# Patient Record
Sex: MALE | Race: OTHER | Employment: UNEMPLOYED | URBAN - METROPOLITAN AREA
[De-identification: names, ages, dates, MRNs, and addresses within clinical notes are randomized per-mention and may not be internally consistent; named-entity substitution may affect disease eponyms.]

---

## 2024-06-24 ENCOUNTER — HOSPITAL ENCOUNTER (EMERGENCY)
Facility: HOSPITAL | Age: 24
Discharge: HOME | End: 2024-06-24
Attending: EMERGENCY MEDICINE

## 2024-06-24 VITALS
SYSTOLIC BLOOD PRESSURE: 139 MMHG | RESPIRATION RATE: 18 BRPM | TEMPERATURE: 98.2 F | WEIGHT: 160.5 LBS | OXYGEN SATURATION: 97 % | HEART RATE: 74 BPM | DIASTOLIC BLOOD PRESSURE: 68 MMHG

## 2024-06-24 DIAGNOSIS — K08.89 TOOTHACHE: Primary | ICD-10-CM

## 2024-06-24 PROCEDURE — 96372 THER/PROPH/DIAG INJ SC/IM: CPT

## 2024-06-24 PROCEDURE — 99282 EMERGENCY DEPT VISIT SF MDM: CPT

## 2024-06-24 PROCEDURE — 99284 EMERGENCY DEPT VISIT MOD MDM: CPT | Performed by: PHYSICIAN ASSISTANT

## 2024-06-24 RX ORDER — IBUPROFEN 600 MG/1
600 TABLET ORAL EVERY 6 HOURS PRN
Qty: 30 TABLET | Refills: 0 | Status: SHIPPED | OUTPATIENT
Start: 2024-06-24

## 2024-06-24 RX ORDER — LIDOCAINE HYDROCHLORIDE 20 MG/ML
15 SOLUTION OROPHARYNGEAL ONCE
Status: COMPLETED | OUTPATIENT
Start: 2024-06-24 | End: 2024-06-24

## 2024-06-24 RX ORDER — KETOROLAC TROMETHAMINE 30 MG/ML
15 INJECTION, SOLUTION INTRAMUSCULAR; INTRAVENOUS ONCE
Status: COMPLETED | OUTPATIENT
Start: 2024-06-24 | End: 2024-06-24

## 2024-06-24 RX ORDER — LIDOCAINE HYDROCHLORIDE 20 MG/ML
10 SOLUTION OROPHARYNGEAL 4 TIMES DAILY PRN
Qty: 100 ML | Refills: 0 | Status: SHIPPED | OUTPATIENT
Start: 2024-06-24

## 2024-06-24 RX ORDER — PENICILLIN V POTASSIUM 500 MG/1
500 TABLET ORAL 4 TIMES DAILY
Qty: 28 TABLET | Refills: 0 | Status: SHIPPED | OUTPATIENT
Start: 2024-06-24 | End: 2024-07-01

## 2024-06-24 RX ORDER — PENICILLIN V POTASSIUM 250 MG/1
500 TABLET ORAL ONCE
Status: COMPLETED | OUTPATIENT
Start: 2024-06-24 | End: 2024-06-24

## 2024-06-24 RX ADMIN — PENICILLIN V POTASSIUM 500 MG: 250 TABLET, FILM COATED ORAL at 23:29

## 2024-06-24 RX ADMIN — LIDOCAINE HYDROCHLORIDE 15 ML: 20 SOLUTION ORAL at 23:29

## 2024-06-24 RX ADMIN — KETOROLAC TROMETHAMINE 15 MG: 30 INJECTION, SOLUTION INTRAMUSCULAR at 23:30

## 2024-06-24 NOTE — Clinical Note
Andreas Foreman was seen and treated in our emergency department on 6/24/2024.    No restrictions            Diagnosis: Toothache    Andreas  may return to work on return date.    He may return on this date: 06/26/2024         If you have any questions or concerns, please don't hesitate to call.      Carri Garcia PA-C    ______________________________           _______________          _______________  Hospital Representative                              Date                                Time

## 2024-06-25 NOTE — ED PROVIDER NOTES
History  Chief Complaint   Patient presents with    Dental Pain     Pt reports dental pain bilaterally x3 days. Reports unable to eat due to pain.      This is a 24-year-old male presenting to the ED for evaluation of bilateral lower dental pain for the past 3 days.  Patient is complaining of pain with eating due to pain.  Patient states that he has a history of cavities in bilateral lower teeth which he had filled.  Patient feels like the filling has fallen out.  Patient does not have an established dentist in the area.  Patient took Motrin and Tylenol without improvement of symptoms.  He denies any fevers or chills.  He is tolerating secretions.      History provided by:  Patient   used: No        None       History reviewed. No pertinent past medical history.    History reviewed. No pertinent surgical history.    History reviewed. No pertinent family history.  I have reviewed and agree with the history as documented.    E-Cigarette/Vaping     E-Cigarette/Vaping Substances     Social History     Tobacco Use    Smoking status: Never    Smokeless tobacco: Never   Substance Use Topics    Alcohol use: Yes     Comment: occ    Drug use: Never       Review of Systems   Constitutional:  Negative for chills and fever.   HENT:  Positive for dental problem. Negative for trouble swallowing.    All other systems reviewed and are negative.      Physical Exam  Physical Exam  Vitals reviewed.   Constitutional:       General: He is not in acute distress.     Appearance: Normal appearance. He is well-developed and well-groomed. He is not ill-appearing.   HENT:      Head: Normocephalic and atraumatic.      Jaw: No trismus or tenderness.      Right Ear: External ear normal.      Left Ear: External ear normal.      Nose: Nose normal.      Mouth/Throat:      Lips: Pink.      Mouth: Mucous membranes are moist.      Dentition: Abnormal dentition. Dental tenderness and dental caries present. No gingival swelling or  dental abscesses.      Comments: Poor overall dental condition.  Eroded teeth and dental caries.  No obvious dental abscess on exam.  No tenderness to palpation of the jaw.  Tolerating secretions without difficulty.  Eyes:      General: No scleral icterus.     Conjunctiva/sclera: Conjunctivae normal.   Cardiovascular:      Rate and Rhythm: Normal rate and regular rhythm.   Pulmonary:      Effort: Pulmonary effort is normal.      Breath sounds: No stridor.   Abdominal:      General: There is no distension.   Musculoskeletal:         General: No deformity. Normal range of motion.      Cervical back: Normal range of motion.   Skin:     Coloration: Skin is not jaundiced or pale.      Findings: No lesion or rash.   Neurological:      Mental Status: He is alert and oriented to person, place, and time.   Psychiatric:         Mood and Affect: Mood normal.         Behavior: Behavior normal. Behavior is cooperative.         Vital Signs  ED Triage Vitals   Temperature Pulse Respirations Blood Pressure SpO2   06/24/24 2305 06/24/24 2305 06/24/24 2305 06/24/24 2305 06/24/24 2305   98.2 °F (36.8 °C) 74 18 139/68 97 %      Temp Source Heart Rate Source Patient Position - Orthostatic VS BP Location FiO2 (%)   06/24/24 2305 06/24/24 2305 06/24/24 2305 06/24/24 2305 --   Oral Monitor Sitting Right arm       Pain Score       06/24/24 2330       8           Vitals:    06/24/24 2305   BP: 139/68   Pulse: 74   Patient Position - Orthostatic VS: Sitting         Visual Acuity      ED Medications  Medications   Lidocaine Viscous HCl (XYLOCAINE) 2 % mucosal solution 15 mL (15 mL Swish & Spit Given 6/24/24 2329)   ketorolac (TORADOL) injection 15 mg (15 mg Intramuscular Given 6/24/24 2330)   penicillin V potassium (VEETID) tablet 500 mg (500 mg Oral Given 6/24/24 2329)       Diagnostic Studies  Results Reviewed       None                   No orders to display              Procedures  Procedures         ED Course                                SBIRT 22yo+      Flowsheet Row Most Recent Value   Initial Alcohol Screen: US AUDIT-C     1. How often do you have a drink containing alcohol? 1 Filed at: 06/24/2024 2307   2. How many drinks containing alcohol do you have on a typical day you are drinking?  1 Filed at: 06/24/2024 2307   3a. Male UNDER 65: How often do you have five or more drinks on one occasion? 0 Filed at: 06/24/2024 2307   Audit-C Score 2 Filed at: 06/24/2024 2307   LUIS FELIPE: How many times in the past year have you...    Used an illegal drug or used a prescription medication for non-medical reasons? Never Filed at: 06/24/2024 2307                      Medical Decision Making      DDx including but not limited to: dental monica, dental infection, dental abscess, dry socket, gingivitis; doubt ryan's angina.     Patient presenting for evaluation of dental pain. The patient has no red flags for Ryan's or serious dental infection at this juncture, no evidence of infection creating a facial cellulitis. The patient doesn't appear toxic, nor has rapid progression of symptoms. Patient isn't immunocompromised. Patient has no SOB nor trouble swallowing. Patient doesn't appear dehydrated nor demonstrates trismus on exam. No indication for imaging such as CT scan at this time. Toradol and viscous lidocaine provided here with improvement of symptoms.    - We will give motrin and viscous lidocaine for pain.  - Given the appearance, antibiotics prescribed at this time.  - The patient will follow-up with her primary care or dentist for re-evaluation of her symptoms.    Prior to discharge, discharge instructions were discussed with patient at bedside. Patient was provided both verbal and written instructions. Patient is understanding of the discharge instructions and is agreeable to plan of care. Return precautions were discussed with patient bedside, patient verbalized understanding of signs and symptoms that would necessitate return  to the ED. All questions were answered. Patient was comfortable with the plan of care and discharged to home.     Dispo: discharge home with follow up with dentist and PCP. Extended conversation with patient discussing need to follow-up with dentist as an outpatient as today in the emergency department my evaluation does not replace evaluation and treatment by a dentist.. Patient agrees and understands. Patient stable, in no acute distress and non-toxic at discharge.    Problems Addressed:  Toothache: acute illness or injury    Risk  Prescription drug management.             Disposition  Final diagnoses:   Toothache     Time reflects when diagnosis was documented in both MDM as applicable and the Disposition within this note       Time User Action Codes Description Comment    6/24/2024 11:24 PM Carri Garcia [K08.89] Toothache           ED Disposition       ED Disposition   Discharge    Condition   Stable    Date/Time   Mon Jun 24, 2024 2324    Comment   Andreas Foreman discharge to home/self care.                   Follow-up Information       Follow up With Specialties Details Why Contact Info    [facility]  Schedule an appointment as soon as possible for a visit   [address] [place] [address]  [phone]            Discharge Medication List as of 6/24/2024 11:25 PM        START taking these medications    Details   ibuprofen (MOTRIN) 600 mg tablet Take 1 tablet (600 mg total) by mouth every 6 (six) hours as needed for mild pain, Starting Mon 6/24/2024, Normal      Lidocaine Viscous HCl (XYLOCAINE) 2 % mucosal solution Swish and spit 10 mL 4 (four) times a day as needed for mouth pain or discomfort, Starting Mon 6/24/2024, Normal      penicillin V potassium (VEETID) 500 mg tablet Take 1 tablet (500 mg total) by mouth 4 (four) times a day for 7 days, Starting Mon 6/24/2024, Until Mon 7/1/2024, Normal             No discharge procedures on file.    PDMP Review       None             ED Provider  Electronically Signed by REYES Guzman PA-C  06/25/24 0005

## 2024-06-25 NOTE — DISCHARGE INSTRUCTIONS
Please refer to the attached information for strict return instructions.  If symptoms worsen or new symptoms develop please return to the ER.  Please follow-up with the dentist physician for re-evaluation of symptoms.

## 2024-09-24 ENCOUNTER — HOSPITAL ENCOUNTER (EMERGENCY)
Facility: HOSPITAL | Age: 24
Discharge: HOME | End: 2024-09-24

## 2024-09-24 VITALS
RESPIRATION RATE: 20 BRPM | HEART RATE: 94 BPM | TEMPERATURE: 98.3 F | SYSTOLIC BLOOD PRESSURE: 146 MMHG | OXYGEN SATURATION: 100 % | DIASTOLIC BLOOD PRESSURE: 92 MMHG | WEIGHT: 159.39 LBS

## 2024-09-24 DIAGNOSIS — K08.89 DENTALGIA: Primary | ICD-10-CM

## 2024-09-24 PROCEDURE — 99282 EMERGENCY DEPT VISIT SF MDM: CPT

## 2024-09-24 PROCEDURE — 99284 EMERGENCY DEPT VISIT MOD MDM: CPT

## 2024-09-24 PROCEDURE — 96372 THER/PROPH/DIAG INJ SC/IM: CPT

## 2024-09-24 RX ORDER — DIPHENHYDRAMINE HYDROCHLORIDE AND LIDOCAINE HYDROCHLORIDE AND ALUMINUM HYDROXIDE AND MAGNESIUM HYDRO
10 KIT ONCE
Status: COMPLETED | OUTPATIENT
Start: 2024-09-24 | End: 2024-09-24

## 2024-09-24 RX ORDER — CHLORHEXIDINE GLUCONATE ORAL RINSE 1.2 MG/ML
15 SOLUTION DENTAL 2 TIMES DAILY
Qty: 120 ML | Refills: 0 | Status: SHIPPED | OUTPATIENT
Start: 2024-09-24

## 2024-09-24 RX ORDER — KETOROLAC TROMETHAMINE 30 MG/ML
15 INJECTION, SOLUTION INTRAMUSCULAR; INTRAVENOUS ONCE
Status: COMPLETED | OUTPATIENT
Start: 2024-09-24 | End: 2024-09-24

## 2024-09-24 RX ORDER — NAPROXEN 500 MG/1
500 TABLET ORAL 2 TIMES DAILY WITH MEALS
Qty: 14 TABLET | Refills: 0 | Status: SHIPPED | OUTPATIENT
Start: 2024-09-24 | End: 2024-10-01

## 2024-09-24 RX ADMIN — AMOXICILLIN AND CLAVULANATE POTASSIUM 1 TABLET: 875; 125 TABLET, COATED ORAL at 22:22

## 2024-09-24 RX ADMIN — KETOROLAC TROMETHAMINE 15 MG: 30 INJECTION, SOLUTION INTRAMUSCULAR; INTRAVENOUS at 22:23

## 2024-09-24 RX ADMIN — DIPHENHYDRAMINE HYDROCHLORIDE AND LIDOCAINE HYDROCHLORIDE AND ALUMINUM HYDROXIDE AND MAGNESIUM HYDRO 10 ML: KIT at 22:44

## 2024-09-24 NOTE — Clinical Note
Andreas Foreman was seen and treated in our emergency department on 9/24/2024.                Diagnosis:     Enyel  .    He may return on this date: 09/27/2024         If you have any questions or concerns, please don't hesitate to call.      Jarrett Vasquez, DO    ______________________________           _______________          _______________  Hospital Representative                              Date                                Time

## 2024-09-25 NOTE — DISCHARGE INSTRUCTIONS
You were evaluated in the emergency department today with dental pain. We did not find an emergent cause of your presentation and feel safe discharging you home.    I attached information for the dental clinic. Please follow up with them. Call them first thing in the morning to schedule an appointment as soon as possible.    I prescribed you peridex mouthwash, orajel, naproxen, and augmentin. This is at your pharmacy. Please take as prescribed.    Please return to the ED if you have worsening uncontrolled pain, difficulty opening your mouth, difficulty swallowing, difficulty breathing, fevers over 100.4F, or any other concerns.    Thank you for choosing us for your care.    Usted fue evaluado en el departamento de emergencias hoy con dolor dental. No encontramos misa causa emergente de white presentación y nos sentimos seguros de darle el mer a white hogar.    Adjunto información de la clínica dental. Por favor davidson un seguimiento con ellos. Llámelos a primera hora de la mañana para programar misa magnus lo antes posible.    Te receté enjuague bucal peridex, orajel, naproxeno y antibiotico. [place] está en tu farmacia. Tómelo según lo prescrito.    Regrese al servicio de urgencias si tiene un dolor incontrolable que empeora, dificultad para abrir la boca, dificultad para tragar, dificultad para respirar, fiebre superior a 100,4 °F o cualquier otra inquietud.    Del por elegirnos para white atención.

## 2024-09-25 NOTE — ED PROVIDER NOTES
1. Dentalgia      ED Disposition       ED Disposition   Discharge    Condition   Stable    Date/Time   Tue Sep 24, 2024 10:15 PM    Comment   Andreas Foreman discharge to home/self care.                   Assessment & Plan       Medical Decision Making  Patient with history as below presented with dental pain. History obtained from patient.    Differential diagnosis includes: Dentalgia, dental caries, dental fracture    Plan: Toradol, Magic mouthwash, Augmentin    Patient was treated with below.Reassessed the patient and they continue to be well appearing. Presentation most consistent with multiple tooth fractures.  Patient given prescription for naproxen, Orajel, Peridex, Augmentin.  Patient given instructions for dental clinic.  Stable for outpatient management.    Disposition: Discharged with instructions to obtain outpatient follow up of patient's symptoms and findings, with strict return precautions if patient develops new or worsening symptoms. Patient understands this plan and is agreeable. All questions answered. Patient discharged home with return precautions.    Risk  OTC drugs.  Prescription drug management.                     Medications   ketorolac (TORADOL) injection 15 mg (15 mg Intramuscular Given 9/24/24 2223)   amoxicillin-clavulanate (AUGMENTIN) 875-125 mg per tablet 1 tablet (1 tablet Oral Given 9/24/24 2222)   diphenhydramine, lidocaine, Al/Mg hydroxide, simethicone (Magic Mouthwash) oral solution 10 mL (10 mL Swish & Spit Given 9/24/24 2244)       History of Present Illness       Patient is a 24-year-old male with no significant past medical history, presenting for evaluation of dental pain.  Patient reports that he has several dental caries with fillings and that this morning he was eating breakfast and he broke 2 separate molars.  He has been having some pain in these areas when he chews.  He does not have any difficulty breathing.  He denies any difficulty swallowing or change to his voice.  He  denies any fevers.  He denies neck swelling.  He reports that he has been using some herbal remedy for pain without relief.  He has not attempted to call a dentist.        Review of Systems   Constitutional:  Negative for fever.   HENT:  Positive for dental problem. Negative for facial swelling, sore throat, trouble swallowing and voice change.    Respiratory:  Negative for shortness of breath.            Objective     ED Triage Vitals [09/24/24 2158]   Temperature Pulse Blood Pressure Respirations SpO2 Patient Position - Orthostatic VS   98.3 °F (36.8 °C) 94 146/92 20 100 % Sitting      Temp Source Heart Rate Source BP Location FiO2 (%) Pain Score    Oral Monitor Right arm -- 10 - Worst Possible Pain        Physical Exam  Vitals and nursing note reviewed.   Constitutional:       General: He is not in acute distress.     Appearance: Normal appearance. He is not ill-appearing or toxic-appearing.   HENT:      Head: Normocephalic and atraumatic.      Right Ear: External ear normal.      Left Ear: External ear normal.      Nose: Nose normal.      Mouth/Throat:      Comments: There is poor dentition throughout.  There are tooth fractures of both tooth 19 as well as 28.  No surrounding gingival fluctuance or areas of abscess collection.  No neck swelling.  No brawny edema under the tongue.  Eyes:      General: No scleral icterus.        Right eye: No discharge.         Left eye: No discharge.      Extraocular Movements: Extraocular movements intact.      Conjunctiva/sclera: Conjunctivae normal.   Cardiovascular:      Rate and Rhythm: Normal rate.      Heart sounds: Normal heart sounds. No murmur heard.     No friction rub. No gallop.   Pulmonary:      Effort: Pulmonary effort is normal. No respiratory distress.      Breath sounds: Normal breath sounds.   Abdominal:      General: Abdomen is flat. There is no distension.      Palpations: Abdomen is soft. There is no mass.      Tenderness: There is no abdominal tenderness.    Genitourinary:     Comments: Deferred  Skin:     General: Skin is warm and dry.   Neurological:      General: No focal deficit present.      Mental Status: He is alert.         Labs Reviewed - No data to display  No orders to display       Procedures    ED Medication and Procedure Management   Prior to Admission Medications   Prescriptions Last Dose Informant Patient Reported? Taking?   Lidocaine Viscous HCl (XYLOCAINE) 2 % mucosal solution   No No   Sig: Swish and spit 10 mL 4 (four) times a day as needed for mouth pain or discomfort   ibuprofen (MOTRIN) 600 mg tablet   No No   Sig: Take 1 tablet (600 mg total) by mouth every 6 (six) hours as needed for mild pain      Facility-Administered Medications: None     Discharge Medication List as of 9/24/2024 10:44 PM        START taking these medications    Details   amoxicillin-clavulanate (AUGMENTIN) 875-125 mg per tablet Take 1 tablet by mouth every 12 (twelve) hours for 7 days, Starting Tue 9/24/2024, Until Tue 10/1/2024, Normal      benzocaine (ORAJEL) 10 % mucosal gel Apply 1 Application to the mouth or throat 2 (two) times a day as needed for mucositis, Starting Tue 9/24/2024, Normal      chlorhexidine (PERIDEX) 0.12 % solution Apply 15 mL to the mouth or throat 2 (two) times a day, Starting Tue 9/24/2024, Normal      naproxen (Naprosyn) 500 mg tablet Take 1 tablet (500 mg total) by mouth 2 (two) times a day with meals for 7 days, Starting Tue 9/24/2024, Until Tue 10/1/2024, Normal           CONTINUE these medications which have NOT CHANGED    Details   ibuprofen (MOTRIN) 600 mg tablet Take 1 tablet (600 mg total) by mouth every 6 (six) hours as needed for mild pain, Starting Mon 6/24/2024, Normal      Lidocaine Viscous HCl (XYLOCAINE) 2 % mucosal solution Swish and spit 10 mL 4 (four) times a day as needed for mouth pain or discomfort, Starting Mon 6/24/2024, Normal           No discharge procedures on file.     Jarrett Vasquez, DO  09/25/24 1388